# Patient Record
Sex: FEMALE | Race: WHITE
[De-identification: names, ages, dates, MRNs, and addresses within clinical notes are randomized per-mention and may not be internally consistent; named-entity substitution may affect disease eponyms.]

---

## 2022-01-03 ENCOUNTER — HOSPITAL ENCOUNTER (OUTPATIENT)
Dept: HOSPITAL 75 - RAD | Age: 27
End: 2022-01-03
Attending: OBSTETRICS & GYNECOLOGY
Payer: COMMERCIAL

## 2022-01-03 DIAGNOSIS — Z32.01: Primary | ICD-10-CM

## 2022-01-03 PROCEDURE — 76817 TRANSVAGINAL US OBSTETRIC: CPT

## 2022-01-03 PROCEDURE — 76801 OB US < 14 WKS SINGLE FETUS: CPT

## 2022-01-03 NOTE — DIAGNOSTIC IMAGING REPORT
INDICATION: 

Positive pregnancy test.



FINDINGS:

There is a single live IUP measuring 6 weeks 1 day gestational

age. The heart rate was recorded at 118 BPM. No baldo-gestational

sac hemorrhage is detected. The adnexa are unremarkable. No

adnexal mass or free fluid is seen. The ovaries are unremarkable.



IMPRESSION: 

Single live IUP of 6 weeks 1 day gestational age. The estimated

date of confinement sonographically is 08/28/2022.



Dictated by: 



  Dictated on workstation # [id number]

## 2022-07-27 ENCOUNTER — HOSPITAL ENCOUNTER (OUTPATIENT)
Dept: HOSPITAL 75 - LABNPT | Age: 27
End: 2022-07-27
Attending: OBSTETRICS & GYNECOLOGY
Payer: COMMERCIAL

## 2022-07-27 DIAGNOSIS — O13.9: Primary | ICD-10-CM

## 2022-07-27 DIAGNOSIS — Z3A.00: ICD-10-CM

## 2022-07-27 LAB
CREAT UR-MCNC: 40 MG/DL (ref 30–125)
PROT UR-MCNC: 10 MG/DL (ref 6–12)

## 2022-07-27 PROCEDURE — 82570 ASSAY OF URINE CREATININE: CPT

## 2022-07-27 PROCEDURE — 84156 ASSAY OF PROTEIN URINE: CPT

## 2022-07-29 ENCOUNTER — HOSPITAL ENCOUNTER (OUTPATIENT)
Dept: HOSPITAL 75 - LABNPT | Age: 27
End: 2022-07-29
Attending: OBSTETRICS & GYNECOLOGY
Payer: COMMERCIAL

## 2022-07-29 DIAGNOSIS — Z01.89: Primary | ICD-10-CM

## 2022-07-29 LAB
CREAT UR-MCNC: 58 MG/DL (ref 30–125)
PROT UR-MCNC: 14 MG/DL (ref 6–12)

## 2022-07-29 PROCEDURE — 84156 ASSAY OF PROTEIN URINE: CPT

## 2022-07-29 PROCEDURE — 82570 ASSAY OF URINE CREATININE: CPT

## 2022-08-01 ENCOUNTER — HOSPITAL ENCOUNTER (OUTPATIENT)
Dept: HOSPITAL 75 - LABNPT | Age: 27
End: 2022-08-01
Attending: OBSTETRICS & GYNECOLOGY
Payer: COMMERCIAL

## 2022-08-01 DIAGNOSIS — O13.9: Primary | ICD-10-CM

## 2022-08-01 DIAGNOSIS — Z3A.00: ICD-10-CM

## 2022-08-01 LAB
CREAT UR-MCNC: 55 MG/DL (ref 30–125)
PROT UR-MCNC: 11 MG/DL (ref 6–12)

## 2022-08-01 PROCEDURE — 82570 ASSAY OF URINE CREATININE: CPT

## 2022-08-01 PROCEDURE — 84156 ASSAY OF PROTEIN URINE: CPT

## 2022-08-05 ENCOUNTER — HOSPITAL ENCOUNTER (OUTPATIENT)
Dept: HOSPITAL 75 - LABNPT | Age: 27
End: 2022-08-05
Attending: OBSTETRICS & GYNECOLOGY
Payer: COMMERCIAL

## 2022-08-05 DIAGNOSIS — Z3A.00: ICD-10-CM

## 2022-08-05 DIAGNOSIS — O13.9: Primary | ICD-10-CM

## 2022-08-05 LAB
CREAT UR-MCNC: 70 MG/DL (ref 30–125)
PROT UR-MCNC: 12 MG/DL (ref 6–12)

## 2022-08-05 PROCEDURE — 84156 ASSAY OF PROTEIN URINE: CPT

## 2022-08-05 PROCEDURE — 82570 ASSAY OF URINE CREATININE: CPT

## 2022-08-09 ENCOUNTER — HOSPITAL ENCOUNTER (OUTPATIENT)
Dept: HOSPITAL 75 - LABNPT | Age: 27
End: 2022-08-09
Attending: OBSTETRICS & GYNECOLOGY
Payer: COMMERCIAL

## 2022-08-09 DIAGNOSIS — Z3A.00: ICD-10-CM

## 2022-08-09 DIAGNOSIS — O13.9: Primary | ICD-10-CM

## 2022-08-09 LAB
CREAT UR-MCNC: 59 MG/DL (ref 30–125)
PROT UR-MCNC: 9 MG/DL (ref 6–12)

## 2022-08-09 PROCEDURE — 84156 ASSAY OF PROTEIN URINE: CPT

## 2022-08-09 PROCEDURE — 82570 ASSAY OF URINE CREATININE: CPT

## 2022-08-12 ENCOUNTER — HOSPITAL ENCOUNTER (INPATIENT)
Dept: HOSPITAL 75 - LDRP | Age: 27
LOS: 1 days | Discharge: HOME | End: 2022-08-13
Attending: OBSTETRICS & GYNECOLOGY | Admitting: OBSTETRICS & GYNECOLOGY
Payer: COMMERCIAL

## 2022-08-12 VITALS — SYSTOLIC BLOOD PRESSURE: 132 MMHG | DIASTOLIC BLOOD PRESSURE: 64 MMHG

## 2022-08-12 VITALS — DIASTOLIC BLOOD PRESSURE: 72 MMHG | SYSTOLIC BLOOD PRESSURE: 134 MMHG

## 2022-08-12 VITALS — SYSTOLIC BLOOD PRESSURE: 120 MMHG | DIASTOLIC BLOOD PRESSURE: 58 MMHG

## 2022-08-12 VITALS — SYSTOLIC BLOOD PRESSURE: 140 MMHG | DIASTOLIC BLOOD PRESSURE: 69 MMHG

## 2022-08-12 VITALS — SYSTOLIC BLOOD PRESSURE: 140 MMHG | DIASTOLIC BLOOD PRESSURE: 59 MMHG

## 2022-08-12 VITALS — DIASTOLIC BLOOD PRESSURE: 82 MMHG | SYSTOLIC BLOOD PRESSURE: 153 MMHG

## 2022-08-12 VITALS — DIASTOLIC BLOOD PRESSURE: 64 MMHG | SYSTOLIC BLOOD PRESSURE: 128 MMHG

## 2022-08-12 VITALS — SYSTOLIC BLOOD PRESSURE: 124 MMHG | DIASTOLIC BLOOD PRESSURE: 66 MMHG

## 2022-08-12 VITALS — HEIGHT: 67.72 IN | WEIGHT: 179.9 LBS | BODY MASS INDEX: 27.58 KG/M2

## 2022-08-12 VITALS — SYSTOLIC BLOOD PRESSURE: 127 MMHG | DIASTOLIC BLOOD PRESSURE: 82 MMHG

## 2022-08-12 VITALS — DIASTOLIC BLOOD PRESSURE: 71 MMHG | SYSTOLIC BLOOD PRESSURE: 137 MMHG

## 2022-08-12 VITALS — SYSTOLIC BLOOD PRESSURE: 133 MMHG | DIASTOLIC BLOOD PRESSURE: 62 MMHG

## 2022-08-12 VITALS — DIASTOLIC BLOOD PRESSURE: 69 MMHG | SYSTOLIC BLOOD PRESSURE: 133 MMHG

## 2022-08-12 VITALS — SYSTOLIC BLOOD PRESSURE: 131 MMHG | DIASTOLIC BLOOD PRESSURE: 64 MMHG

## 2022-08-12 VITALS — SYSTOLIC BLOOD PRESSURE: 125 MMHG | DIASTOLIC BLOOD PRESSURE: 67 MMHG

## 2022-08-12 VITALS — SYSTOLIC BLOOD PRESSURE: 130 MMHG | DIASTOLIC BLOOD PRESSURE: 78 MMHG

## 2022-08-12 VITALS — DIASTOLIC BLOOD PRESSURE: 58 MMHG | SYSTOLIC BLOOD PRESSURE: 120 MMHG

## 2022-08-12 VITALS — SYSTOLIC BLOOD PRESSURE: 137 MMHG | DIASTOLIC BLOOD PRESSURE: 78 MMHG

## 2022-08-12 VITALS — SYSTOLIC BLOOD PRESSURE: 123 MMHG | DIASTOLIC BLOOD PRESSURE: 72 MMHG

## 2022-08-12 VITALS — SYSTOLIC BLOOD PRESSURE: 144 MMHG | DIASTOLIC BLOOD PRESSURE: 64 MMHG

## 2022-08-12 VITALS — SYSTOLIC BLOOD PRESSURE: 139 MMHG | DIASTOLIC BLOOD PRESSURE: 67 MMHG

## 2022-08-12 VITALS — DIASTOLIC BLOOD PRESSURE: 72 MMHG | SYSTOLIC BLOOD PRESSURE: 123 MMHG

## 2022-08-12 VITALS — DIASTOLIC BLOOD PRESSURE: 76 MMHG | SYSTOLIC BLOOD PRESSURE: 141 MMHG

## 2022-08-12 VITALS — SYSTOLIC BLOOD PRESSURE: 131 MMHG | DIASTOLIC BLOOD PRESSURE: 61 MMHG

## 2022-08-12 VITALS — DIASTOLIC BLOOD PRESSURE: 76 MMHG | SYSTOLIC BLOOD PRESSURE: 133 MMHG

## 2022-08-12 VITALS — SYSTOLIC BLOOD PRESSURE: 145 MMHG | DIASTOLIC BLOOD PRESSURE: 75 MMHG

## 2022-08-12 VITALS — SYSTOLIC BLOOD PRESSURE: 135 MMHG | DIASTOLIC BLOOD PRESSURE: 67 MMHG

## 2022-08-12 VITALS — SYSTOLIC BLOOD PRESSURE: 125 MMHG | DIASTOLIC BLOOD PRESSURE: 78 MMHG

## 2022-08-12 VITALS — DIASTOLIC BLOOD PRESSURE: 87 MMHG | SYSTOLIC BLOOD PRESSURE: 156 MMHG

## 2022-08-12 VITALS — DIASTOLIC BLOOD PRESSURE: 82 MMHG | SYSTOLIC BLOOD PRESSURE: 142 MMHG

## 2022-08-12 VITALS — DIASTOLIC BLOOD PRESSURE: 72 MMHG | SYSTOLIC BLOOD PRESSURE: 128 MMHG

## 2022-08-12 VITALS — SYSTOLIC BLOOD PRESSURE: 136 MMHG | DIASTOLIC BLOOD PRESSURE: 72 MMHG

## 2022-08-12 VITALS — SYSTOLIC BLOOD PRESSURE: 130 MMHG | DIASTOLIC BLOOD PRESSURE: 75 MMHG

## 2022-08-12 VITALS — SYSTOLIC BLOOD PRESSURE: 129 MMHG | DIASTOLIC BLOOD PRESSURE: 57 MMHG

## 2022-08-12 VITALS — DIASTOLIC BLOOD PRESSURE: 74 MMHG | SYSTOLIC BLOOD PRESSURE: 132 MMHG

## 2022-08-12 VITALS — SYSTOLIC BLOOD PRESSURE: 146 MMHG | DIASTOLIC BLOOD PRESSURE: 72 MMHG

## 2022-08-12 VITALS — DIASTOLIC BLOOD PRESSURE: 59 MMHG | SYSTOLIC BLOOD PRESSURE: 121 MMHG

## 2022-08-12 VITALS — SYSTOLIC BLOOD PRESSURE: 129 MMHG | DIASTOLIC BLOOD PRESSURE: 80 MMHG

## 2022-08-12 VITALS — SYSTOLIC BLOOD PRESSURE: 141 MMHG | DIASTOLIC BLOOD PRESSURE: 65 MMHG

## 2022-08-12 DIAGNOSIS — O41.03X0: ICD-10-CM

## 2022-08-12 DIAGNOSIS — Z3A.37: ICD-10-CM

## 2022-08-12 LAB
BASOPHILS # BLD AUTO: 0 10^3/UL (ref 0–0.1)
BASOPHILS NFR BLD AUTO: 1 % (ref 0–10)
EOSINOPHIL # BLD AUTO: 0.1 10^3/UL (ref 0–0.3)
EOSINOPHIL NFR BLD AUTO: 1 % (ref 0–10)
HCT VFR BLD CALC: 27 % (ref 35–52)
HGB BLD-MCNC: 9 G/DL (ref 11.5–16)
LYMPHOCYTES # BLD AUTO: 1.4 10^3/UL (ref 1–4)
LYMPHOCYTES NFR BLD AUTO: 16 % (ref 12–44)
MANUAL DIFFERENTIAL PERFORMED BLD QL: NO
MCH RBC QN AUTO: 28 PG (ref 25–34)
MCHC RBC AUTO-ENTMCNC: 33 G/DL (ref 32–36)
MCV RBC AUTO: 86 FL (ref 80–99)
MONOCYTES # BLD AUTO: 0.7 10^3/UL (ref 0–1)
MONOCYTES NFR BLD AUTO: 8 % (ref 0–12)
NEUTROPHILS # BLD AUTO: 6.5 10^3/UL (ref 1.8–7.8)
NEUTROPHILS NFR BLD AUTO: 75 % (ref 42–75)
PLATELET # BLD: 179 10^3/UL (ref 130–400)
PMV BLD AUTO: 12.8 FL (ref 9–12.2)
WBC # BLD AUTO: 8.7 10^3/UL (ref 4.3–11)

## 2022-08-12 PROCEDURE — 36415 COLL VENOUS BLD VENIPUNCTURE: CPT

## 2022-08-12 PROCEDURE — 85025 COMPLETE CBC W/AUTO DIFF WBC: CPT

## 2022-08-12 PROCEDURE — 86901 BLOOD TYPING SEROLOGIC RH(D): CPT

## 2022-08-12 PROCEDURE — 86850 RBC ANTIBODY SCREEN: CPT

## 2022-08-12 PROCEDURE — 86900 BLOOD TYPING SEROLOGIC ABO: CPT

## 2022-08-12 RX ADMIN — KETOROLAC TROMETHAMINE SCH MG: 30 INJECTION, SOLUTION INTRAMUSCULAR; INTRAVENOUS at 22:54

## 2022-08-12 NOTE — DISCHARGE INST-SURGICAL
Discharge Inst-Surgical


Depart Medication/Instructions


New, Converted or Re-Newed RX:  Transmitted to Pharmacy





Consults/Follow Up


Patient Instructions:  


As directed


Orders & Referrals





Follow Up Appt:


Call to make follow up appt. for patient in 4 weeks.





Activity 


Per routine post vaginal delivery instructions.





Prescription for Percocet Motrin and Colace have been sent to patient's pharmacy

from my office





Diet as tolerated





Patient may shower or tub bathe as desired.





Activity


Activity as Tolerated:  No





Diet


Discharge Diet:  No Restrictions











CE DEJESUS MD       Aug 12, 2022 21:03

## 2022-08-12 NOTE — HISTORY & PHYSICAL
History and Physical


Date Seen by Provider:  Aug 12, 2022


Time Seen by Provider:  13:41


This patient is a 27-year-old  1 female currently at 37-3/7 weeks 

gestation.  Her pregnancy is complicated by PIH.  Urine protein creatinine 

ratios have been approaching 0.3 but have maintained under that level so far.She

was seen today for follow-up after biophysical profile had demonstrated an ALEX 

of 5.Recheck today showed an ALEX of 3.Decision made to admit and deliver 

patient.  Is not certain whether she may be leaking amniotic fluid as she 

described only being moist.  Her GBS culture was positive.





Patient denies contractions pain or pressure.





Allergies are none





Medications are prenatal vitamins





Medical social and surgical history is are per the antepartum record





HEENT exam is normal


Neck is supple no lymphadenopathy no thyromegaly


Abdomen is gravid soft nontender nondistended


Extremities show no clubbing or cyanosis.  There is no Homans' sign.





Pelvic exam is pending





Fetal monitor shows a category 1 fetal heart rate tracing with occasional 

contractions and some uterine irritability





Lab work is pending





Assessment and plan


37-3/7 weeks gestation in a patient with PIH and severe oligohydramnios.  Plan 

is for admission administration of ampicillin IV for GBS prophylaxis and 

induction of labor with amniotomy and Pitocin.  We anticipate a vaginal delivery

but would be prepared for a  delivery if needed.  Patient is aware that 

with PIH and although the likelihood of a  is slightly higher


37-3/7 weeks gestation with PIH and oligohydramnios





Allergies and Home Medications


Allergies


Coded Allergies:  


     No Known Drug Allergies (Unverified , 22)





Patient Home Medication List


Home Medication List Reviewed:  Yes











CE DEJESUS MD       Aug 12, 2022 13:44

## 2022-08-12 NOTE — DISCHARGE INST-SURGICAL
Discharge Inst-Surgical


Depart Medication/Instructions


New, Converted or Re-Newed RX:  Transmitted to Pharmacy





Consults/Follow Up


Patient Instructions:  


As directed


Orders & Referrals





Follow Up Appt:


Call to make follow up appt. for patient in 1 week For suture removal On August 18, 2022 at 9:30 AM.





Activity: 


Rest for 24 hours, than as tolerated. 





Wound Care:


May remove Band-Aid tomorrow. Replace as desired. Keep incisions clean and dry. 

Wash daily with soap and water.





Prescriptions for Percocet Motrin and Colace have been sent to patient's 

pharmacy from my office








Diet: 


As tolerated-





 shower or tub bathe as desired.





No driving for 24 hours, no alcoholic beverages for 24 hours, and nothing per 

vagina (no tampons, douching, or intercourse) for 2 weeks.





Patient to return to the clinic as soon as possible for: Temperature greater 

than 101F, Severe Pain, Foul discharge from incision or vagina, Excessive 

Bleeding (more than a period).





Activity


Activity as Tolerated:  No





Diet


Discharge Diet:  No Restrictions











CE DEJESUS MD       Aug 12, 2022 20:47

## 2022-08-13 VITALS — SYSTOLIC BLOOD PRESSURE: 139 MMHG | DIASTOLIC BLOOD PRESSURE: 88 MMHG

## 2022-08-13 VITALS — DIASTOLIC BLOOD PRESSURE: 79 MMHG | SYSTOLIC BLOOD PRESSURE: 138 MMHG

## 2022-08-13 VITALS — SYSTOLIC BLOOD PRESSURE: 130 MMHG | DIASTOLIC BLOOD PRESSURE: 85 MMHG

## 2022-08-13 VITALS — SYSTOLIC BLOOD PRESSURE: 138 MMHG | DIASTOLIC BLOOD PRESSURE: 79 MMHG

## 2022-08-13 VITALS — SYSTOLIC BLOOD PRESSURE: 123 MMHG | DIASTOLIC BLOOD PRESSURE: 71 MMHG

## 2022-08-13 VITALS — SYSTOLIC BLOOD PRESSURE: 128 MMHG | DIASTOLIC BLOOD PRESSURE: 86 MMHG

## 2022-08-13 RX ADMIN — KETOROLAC TROMETHAMINE SCH MG: 30 INJECTION, SOLUTION INTRAMUSCULAR; INTRAVENOUS at 04:55

## 2022-08-13 RX ADMIN — IBUPROFEN SCH MG: 800 TABLET, FILM COATED ORAL at 10:36

## 2022-08-13 RX ADMIN — IBUPROFEN SCH MG: 800 TABLET, FILM COATED ORAL at 17:51

## 2022-08-13 NOTE — OPERATIVE REPORT
DATE OF SERVICE:  08/12/2022



DELIVERY NOTE



The patient delivered by term spontaneous vaginal delivery a viable female

infant with Apgars of 8 and 9 at 1 and 5 minutes respectively, weight of 7

pounds 5 ounces.  Cord blood pH is pending and a birth time of 19:58.



The infant was bulb suctioned on delivery of the head and again on completion of

delivery.  Umbilical cord was doubly clamped, the father cut the cord, the baby

was passed to mom's abdomen.



Cord bloods were obtained.  The placenta delivered fairly promptly spontaneously

[place].  It was normal with 3-vessel cord.  The cervix, vagina, rectum, and

perineum were examined except for a midline episiotomy that was performed with

the fetal heart rate dropped to the 90s and mom unable to expel the baby.  The

episiotomy shorten the second stage of labor appropriately.



The episiotomy was repaired with a single suture of 3-0 Rapide in the usual

manner to good hemostasis and good reapproximation.



Sponge and needle counts were correct on completion of delivery and the repair. 

Blood loss was around 200 mL.  The patient tolerated the delivery well and

remained in the LDR with the baby.





Job ID: [id number]

DocumentID: [id number]

Dictated Date:  08/12/2022 20:18:39

Transcription Date: 08/13/2022 03:30:48

Dictated By: CE DEJESUS MD

## 2022-08-13 NOTE — ANESTHESIA-REGIONAL POST-OP
Regional


Patient Condition


Mental Status:  Alert, Oriented x3


Circulation:  Same as Pre-Op


Headache:  Absent


Sensation:  Full Recovery


Motor Block:  Absent





Post Op Complications


Complications


None





Follow Up Care/Instructions


Patient Instructions


None needed.





Anesthesia/Patient Condition


Patient is doing well, no complaints, stable vital signs, no apparent adverse 

anesthesia problems.   


No complications reported per nursing.


D/C home per [facility] [place]:  Yes











ADRYAN CLAROS CRNA           Aug 13, 2022 08:28

## 2022-08-13 NOTE — PROGRESS NOTE
Standard Progress Note


Progress Notes/Assess & Plan


Date Seen by a Provider:  Aug 13, 2022


Time Seen by a Provider:  07:17


Progress/Assessment & Plan


This patient without complaint.  She is ambulating, voiding, tolerating oral 

intake well and has good pain control.  She denies headache, denies shortness of

breath, denies nausea or vomiting.








Vital Signs








  Date Time  Temp Pulse Resp B/P (MAP) Pulse Ox O2 Delivery O2 Flow Rate FiO2


 


8/13/22 04:41 36.4 81 18 123/71 (88) 98 Room Air  


 


8/12/22 22:53  66 18 137/78 (97)  Room Air  


 


8/12/22 22:38  64 18 129/57 (81)  Room Air  


 


8/12/22 22:23  73 18 121/59 (79)  Room Air  


 


8/12/22 22:07  76 18 131/64 (86)  Room Air  


 


8/12/22 21:53  83 18 125/67 (86)  Room Air  


 


8/12/22 21:38  81 18 132/64 (86)  Room Air  


 


8/12/22 21:23  74 18 133/62 (85)  Room Air  


 


8/12/22 21:08  72 18 131/61 (84)  Room Air  


 


8/12/22 20:53  77 18 144/64 (90)  Room Air  


 


8/12/22 20:38  77 18 133/69 (90)  Room Air  


 


8/12/22 20:23  78 18 140/69 (92)  Room Air  


 


8/12/22 20:08  100 18 140/59 (86)  Room Air  


 


8/12/22 19:58  69 18 141/65 (90)  Room Air  


 


8/12/22 19:45  73 18 132/74 (93)  Room Air  


 


8/12/22 19:30  71 18 156/87 (110)  Room Air  


 


8/12/22 19:15 36.2 70    Room Air  


 


8/12/22 19:00  70  153/82 (105)  Room Air  


 


8/12/22 18:45  70  141/76 (97)  Room Air  


 


8/12/22 18:30  69  123/72 (89) 100 Room Air  


 


8/12/22 18:15  69  123/72 (89)  Room Air  


 


8/12/22 18:00  65 16 133/76 (95) 100 Room Air  


 


8/12/22 17:45 36.3 74 16 130/75 (93)  Room Air  


 


8/12/22 17:30  68 16 124/66 (85) 100 Room Air  


 


8/12/22 17:15  69 16 120/58 (78)  Room Air  


 


8/12/22 17:00  69 16 120/58 (78)  Room Air  


 


8/12/22 16:45  71 16 128/64 (85)  Room Air  


 


8/12/22 16:30  73  146/72 (96)  Room Air  


 


8/12/22 16:15 36.8 73 18 134/72 (92)  Room Air  


 


8/12/22 16:00  75 18 142/82 (102)  Room Air  


 


8/12/22 15:45  69  137/71 (93)  Room Air  


 


8/12/22 15:30  62  130/78 (95)  Room Air  


 


8/12/22 15:15  74  135/67 (89)  Room Air  


 


8/12/22 15:00  75 18 139/67 (91)  Room Air  


 


8/12/22 14:45  81 18 127/82 (97)  Room Air  


 


8/12/22 14:30  71  136/72 (93)  Room Air  


 


8/12/22 14:15 37.6 71  145/75 (98)  Room Air  


 


8/12/22 14:00  74  125/78 (94)  Room Air  


 


8/12/22 13:45  80 18 129/80 (96)  Room Air  


 


8/12/22 12:45 37.4 75 18  97 Room Air  


 


8/12/22 12:45 37.4 75 18 128/72 (90) 97 Room Air  














I & O 


 


 8/13/22





 07:00


 


Intake Total 1022.4 ml


 


Balance 1022.4 ml





Vital signs are stable.  Patient is afebrile





The abdomen is benign fundus is firm below the umbilicus and nontender


Extremities show no clubbing or cyanosis.  There is no Homans' sign.





Assessment and plan


Postpartum day #1 status post term spontaneous vaginal livery doing well.  Blood

pressures are normalizing.  Plan is for routine convalescent care today and 

consider for discharge home tomorrow


Final Diagnosis


37-week spontaneous vaginal delivery











CE DEJESUS MD       Aug 13, 2022 07:18
